# Patient Record
Sex: FEMALE | Race: ASIAN | Employment: UNEMPLOYED | URBAN - METROPOLITAN AREA
[De-identification: names, ages, dates, MRNs, and addresses within clinical notes are randomized per-mention and may not be internally consistent; named-entity substitution may affect disease eponyms.]

---

## 2020-06-22 ENCOUNTER — HOSPITAL ENCOUNTER (OUTPATIENT)
Dept: GENERAL RADIOLOGY | Facility: HOSPITAL | Age: 60
Discharge: HOME | End: 2020-06-22
Attending: INTERNAL MEDICINE
Payer: COMMERCIAL

## 2020-06-22 DIAGNOSIS — M79.641 PAIN IN RIGHT HAND: ICD-10-CM

## 2020-06-22 PROCEDURE — 73130 X-RAY EXAM OF HAND: CPT | Performed by: INTERNAL MEDICINE

## 2021-03-24 ENCOUNTER — IMMUNIZATION (OUTPATIENT)
Dept: LAB | Age: 61
End: 2021-03-24

## 2021-03-24 DIAGNOSIS — Z23 NEED FOR VACCINATION: Primary | ICD-10-CM

## 2021-03-24 PROCEDURE — 91300 COVID 19 PFIZER-BIONTECH: CPT | Performed by: HOSPITALIST

## 2021-03-24 PROCEDURE — 0001A COVID 19 PFIZER-BIONTECH: CPT | Performed by: HOSPITALIST

## 2021-04-15 ENCOUNTER — IMMUNIZATION (OUTPATIENT)
Dept: LAB | Age: 61
End: 2021-04-15
Attending: NURSE PRACTITIONER

## 2021-04-15 DIAGNOSIS — Z23 NEED FOR VACCINATION: Primary | ICD-10-CM

## 2021-04-15 PROCEDURE — 0002A COVID 19 PFIZER-BIONTECH: CPT

## 2021-04-15 PROCEDURE — 91300 COVID 19 PFIZER-BIONTECH: CPT

## 2021-04-23 ENCOUNTER — OFFICE VISIT (OUTPATIENT)
Dept: SURGERY | Facility: CLINIC | Age: 61
End: 2021-04-23
Payer: COMMERCIAL

## 2021-04-23 VITALS
HEIGHT: 62 IN | WEIGHT: 135.63 LBS | TEMPERATURE: 97 F | BODY MASS INDEX: 24.96 KG/M2 | HEART RATE: 69 BPM | DIASTOLIC BLOOD PRESSURE: 86 MMHG | SYSTOLIC BLOOD PRESSURE: 130 MMHG

## 2021-04-23 DIAGNOSIS — K80.20 CHOLELITHIASIS WITHOUT CHOLECYSTITIS: Primary | ICD-10-CM

## 2021-04-23 PROCEDURE — 3008F BODY MASS INDEX DOCD: CPT | Performed by: SURGERY

## 2021-04-23 PROCEDURE — 3075F SYST BP GE 130 - 139MM HG: CPT | Performed by: SURGERY

## 2021-04-23 PROCEDURE — 99203 OFFICE O/P NEW LOW 30 MIN: CPT | Performed by: SURGERY

## 2021-04-23 PROCEDURE — 3079F DIAST BP 80-89 MM HG: CPT | Performed by: SURGERY

## 2021-04-23 NOTE — H&P
New Patient Visit Note       Active Problems      1. Cholelithiasis without cholecystitis        Chief Complaint   Patient presents with:  Gallbladder: Gallbladder consult - Ref by: Ned Swift c/o No current abdominal pain.   Pt states feels pressure in u Gastrointestinal: Negative for abdominal distention, abdominal pain, anal bleeding, blood in stool, constipation, diarrhea, nausea and vomiting. Genitourinary: Negative for difficulty urinating, dysuria, frequency and urgency.    Musculoskeletal: Trent Chant as well. I spent 30 minutes face to face with the patient. More than 50% of that time was spent counseling the patient and/or on coordination of care. The diagnosis, prognosis, and general treatment was explained to the patient and the family.          Radha Al

## 2021-10-26 PROBLEM — R79.89 ELEVATED LFTS: Status: ACTIVE | Noted: 2021-10-26

## 2021-10-26 PROBLEM — K76.0 FATTY LIVER: Status: ACTIVE | Noted: 2021-10-26

## 2024-10-08 ENCOUNTER — OFFICE VISIT (OUTPATIENT)
Facility: LOCATION | Age: 64
End: 2024-10-08
Payer: MEDICARE

## 2024-10-08 ENCOUNTER — TELEPHONE (OUTPATIENT)
Facility: LOCATION | Age: 64
End: 2024-10-08

## 2024-10-08 VITALS
WEIGHT: 133 LBS | OXYGEN SATURATION: 97 % | HEIGHT: 61 IN | BODY MASS INDEX: 25.11 KG/M2 | SYSTOLIC BLOOD PRESSURE: 149 MMHG | DIASTOLIC BLOOD PRESSURE: 93 MMHG | HEART RATE: 87 BPM | RESPIRATION RATE: 18 BRPM | TEMPERATURE: 98 F

## 2024-10-08 DIAGNOSIS — K80.50 BILIARY COLIC: Primary | ICD-10-CM

## 2024-10-08 RX ORDER — METOPROLOL SUCCINATE 25 MG/1
25 TABLET, EXTENDED RELEASE ORAL DAILY
COMMUNITY
Start: 2024-06-28

## 2024-10-08 RX ORDER — LISINOPRIL 5 MG/1
5 TABLET ORAL DAILY
COMMUNITY
Start: 2024-06-27

## 2024-10-08 NOTE — H&P
Patient ID: Mai Rich is a 64 year old female.    Chief Complaint   Patient presents with    Gallstones     NP GALLSTONES patient had CT scan patient states she is having abdominal pain lower right side        HPI: Mai Rich is a 64 year old female presents to clinic for evaluation.  Patient previously saw Dr. Hathaway for abdominal pain.  At that time, she was found to have cholelithiasis on ultrasound imaging.  She did not undergo surgical intervention.  This was 4 years ago.  Recently, patient had another episode of severe abdominal pain.  She denied any abdominal bloating, nausea, or diarrhea.  She underwent CT imaging and ultrasound which redemonstrated cholelithiasis.  Patient does report having heartburn symptoms and a sour stomach but denies any other symptoms in the past 4 years.  Her abdominal surgeries include a .      Workup:               Past Medical History  Past Medical History:    High blood pressure       Past Surgical History  History reviewed. No pertinent surgical history.    Medications  Current Outpatient Medications   Medication Sig Dispense Refill    lisinopril 5 MG Oral Tab Take 1 tablet (5 mg total) by mouth daily.      metoprolol succinate ER 25 MG Oral Tablet 24 Hr Take 1 tablet (25 mg total) by mouth daily.      aspirin 81 MG Oral Tab EC Take 1 tablet (81 mg total) by mouth daily.         Allergies  Not on File    Social History  History   Smoking Status    Every Day   Smokeless Tobacco    Never     History   Alcohol Use Never     History   Drug Use Unknown       Family History  History reviewed. No pertinent family history.    Review of Systems  Review of Systems   Constitutional: Negative.    Respiratory: Negative.     Cardiovascular: Negative.    Gastrointestinal:  Positive for abdominal pain. Negative for abdominal distention, constipation, diarrhea, nausea and vomiting.       Exam  Vitals:    10/08/24 1432   BP: (!) 149/93   Pulse: 87   Resp: 18   Temp: 97.7 °F  (36.5 °C)     Physical Exam  Constitutional:       Appearance: Normal appearance.   Cardiovascular:      Rate and Rhythm: Normal rate.   Pulmonary:      Effort: Pulmonary effort is normal.   Abdominal:      General: Abdomen is flat. There is no distension.      Palpations: Abdomen is soft.      Tenderness: There is no abdominal tenderness.   Skin:     General: Skin is warm and dry.   Neurological:      Mental Status: She is alert and oriented to person, place, and time.           Assessment/Plan  Assessment   Problem List Items Addressed This Visit          Gastrointestinal and Abdominal    Biliary colic - Primary       Mai Rich is a 64 year old female with biliary colic    Based on patient's symptoms, I believe she is suffering from biliary colic  Although patient's severe episodes were only 2 and 4 years apart, she reports having intermittent heartburn symptoms throughout the years  I believe that this is a symptom of her gallbladder  I recommend proceeding with cholecystectomy  We will plan for laparoscopic cholecystectomy with intraoperative cholangiogram  Procedure explained to the patient  Risks including bleeding, pain, infection, bile leak, injury to the common bile duct, and need for further intervention all discussed with the patient  Postoperative restrictions also discussed, these include no heavy lifting greater than 15 to 20 pounds for 4-6 weeks  Patient will need medical clearance prior to surgery  Patient acknowledged understanding and wishes to proceed    Patient can call the office for any questions or concerns  Patient is to go to the emergency room for any worsening abdominal pain, nausea, vomiting, and fever as this may be acute cholecystitis and patient may need surgery sooner      Negra Ness MD  General Surgery  [facility]     CC:  Marielle Grewal MD

## 2024-10-08 NOTE — TELEPHONE ENCOUNTER
Rcvd message from PSR Jenna that Pt wants hortencia jensene completed this month by SEMAJ if sx can'rt be completed in October than wants sx in MARCH 2025 however there is no time this month- PT was offered 12/9- Pt is not scheduled enriqueta I lvm waiting to hear back from pt.

## 2024-10-14 ENCOUNTER — TELEPHONE (OUTPATIENT)
Facility: LOCATION | Age: 64
End: 2024-10-14

## 2024-10-14 DIAGNOSIS — K80.50 BILIARY COLIC: Primary | ICD-10-CM

## 2024-11-25 ENCOUNTER — TELEPHONE (OUTPATIENT)
Facility: LOCATION | Age: 64
End: 2024-11-25

## 2024-11-25 NOTE — TELEPHONE ENCOUNTER
SILVER WATSON Patient  Member ID  [id number]    Date of Birth  1960-08-25    Gender  Female    Eligibility Status  Active Coverage    Group Number  [id number]    Plan / Coverage Date  2024-01-01    Transaction Type  Outpatient Authorization    Organization  [facility]    Payer  AET[employer] (COMMERCIAL & MEDICARE)    [employer]etna logo  Transaction ID: Not FoundCustomer ID: [id number] Date: NA  No Authorization Required  Place of Service  [facility]    Service From - To Date  NA    Admission Type  9    Diagnosis Code 1   - Calculus of bile duct w/o cholangitis or cholecyst w/o obst    Procedure Code 1  51726    Quantity  1 Units    Procedure From - To Date  2024-12-09    Status  NO AUTH REQUIRED    Message  PRECERT IS NOT REQUIRED OR ONLY REQUIRED IN UNIQUE CIRCUMSTANCES FOR MEDICAID REFER TO PRIOR AUTH TOOL ON [employer] FOR ALL OTHERS REFER TO CODE SEARCH TOOL ON [employer] COVERAGE OF SERVICES ARE SUBJECT TO BENEFITS AND ELIGIBILITY

## 2024-12-09 ENCOUNTER — APPOINTMENT (OUTPATIENT)
Dept: GENERAL RADIOLOGY | Facility: HOSPITAL | Age: 64
End: 2024-12-09
Attending: STUDENT IN AN ORGANIZED HEALTH CARE EDUCATION/TRAINING PROGRAM
Payer: MEDICARE

## 2024-12-09 ENCOUNTER — ANESTHESIA (OUTPATIENT)
Dept: SURGERY | Facility: HOSPITAL | Age: 64
End: 2024-12-09
Payer: MEDICARE

## 2024-12-09 ENCOUNTER — ANESTHESIA EVENT (OUTPATIENT)
Dept: SURGERY | Facility: HOSPITAL | Age: 64
End: 2024-12-09
Payer: MEDICARE

## 2024-12-09 ENCOUNTER — HOSPITAL ENCOUNTER (OUTPATIENT)
Facility: HOSPITAL | Age: 64
Setting detail: HOSPITAL OUTPATIENT SURGERY
Discharge: HOME | End: 2024-12-09
Attending: STUDENT IN AN ORGANIZED HEALTH CARE EDUCATION/TRAINING PROGRAM | Admitting: STUDENT IN AN ORGANIZED HEALTH CARE EDUCATION/TRAINING PROGRAM
Payer: MEDICARE

## 2024-12-09 VITALS
WEIGHT: 131 LBS | HEIGHT: 61 IN | TEMPERATURE: 98 F | DIASTOLIC BLOOD PRESSURE: 85 MMHG | SYSTOLIC BLOOD PRESSURE: 150 MMHG | RESPIRATION RATE: 16 BRPM | HEART RATE: 85 BPM | BODY MASS INDEX: 24.73 KG/M2 | OXYGEN SATURATION: 95 %

## 2024-12-09 DIAGNOSIS — K80.50 BILIARY COLIC: Primary | ICD-10-CM

## 2024-12-09 PROCEDURE — 47563 LAPARO CHOLECYSTECTOMY/GRAPH: CPT | Performed by: STUDENT IN AN ORGANIZED HEALTH CARE EDUCATION/TRAINING PROGRAM

## 2024-12-09 PROCEDURE — 74300 X-RAY BILE DUCTS/PANCREAS: CPT | Performed by: STUDENT IN AN ORGANIZED HEALTH CARE EDUCATION/TRAINING PROGRAM

## 2024-12-09 PROCEDURE — BF131ZZ FLUOROSCOPY OF GALLBLADDER AND BILE DUCTS USING LOW OSMOLAR CONTRAST: ICD-10-PCS | Performed by: STUDENT IN AN ORGANIZED HEALTH CARE EDUCATION/TRAINING PROGRAM

## 2024-12-09 PROCEDURE — 0FT44ZZ RESECTION OF GALLBLADDER, PERCUTANEOUS ENDOSCOPIC APPROACH: ICD-10-PCS | Performed by: STUDENT IN AN ORGANIZED HEALTH CARE EDUCATION/TRAINING PROGRAM

## 2024-12-09 PROCEDURE — 47563 LAPARO CHOLECYSTECTOMY/GRAPH: CPT

## 2024-12-09 RX ORDER — SODIUM CHLORIDE, SODIUM LACTATE, POTASSIUM CHLORIDE, CALCIUM CHLORIDE 600; 310; 30; 20 MG/100ML; MG/100ML; MG/100ML; MG/100ML
INJECTION, SOLUTION INTRAVENOUS CONTINUOUS
Status: DISCONTINUED | OUTPATIENT
Start: 2024-12-09 | End: 2024-12-09

## 2024-12-09 RX ORDER — HYDROCODONE BITARTRATE AND ACETAMINOPHEN 5; 325 MG/1; MG/1
1 TABLET ORAL ONCE AS NEEDED
Status: COMPLETED | OUTPATIENT
Start: 2024-12-09 | End: 2024-12-09

## 2024-12-09 RX ORDER — ONDANSETRON 2 MG/ML
INJECTION INTRAMUSCULAR; INTRAVENOUS AS NEEDED
Status: DISCONTINUED | OUTPATIENT
Start: 2024-12-09 | End: 2024-12-09 | Stop reason: SURG

## 2024-12-09 RX ORDER — ACETAMINOPHEN 500 MG
1000 TABLET ORAL ONCE
Status: DISCONTINUED | OUTPATIENT
Start: 2024-12-09 | End: 2024-12-09 | Stop reason: HOSPADM

## 2024-12-09 RX ORDER — ASPIRIN 81 MG/1
81 TABLET ORAL DAILY
Status: SHIPPED | COMMUNITY
Start: 2024-12-11

## 2024-12-09 RX ORDER — PHENYLEPHRINE HCL 10 MG/ML
VIAL (ML) INJECTION AS NEEDED
Status: DISCONTINUED | OUTPATIENT
Start: 2024-12-09 | End: 2024-12-09 | Stop reason: SURG

## 2024-12-09 RX ORDER — ONDANSETRON 4 MG/1
4 TABLET, ORALLY DISINTEGRATING ORAL EVERY 4 HOURS PRN
Qty: 10 TABLET | Refills: 0 | Status: SHIPPED | OUTPATIENT
Start: 2024-12-09

## 2024-12-09 RX ORDER — HYDROMORPHONE HYDROCHLORIDE 1 MG/ML
0.6 INJECTION, SOLUTION INTRAMUSCULAR; INTRAVENOUS; SUBCUTANEOUS EVERY 5 MIN PRN
Status: DISCONTINUED | OUTPATIENT
Start: 2024-12-09 | End: 2024-12-09

## 2024-12-09 RX ORDER — ROCURONIUM BROMIDE 10 MG/ML
INJECTION, SOLUTION INTRAVENOUS AS NEEDED
Status: DISCONTINUED | OUTPATIENT
Start: 2024-12-09 | End: 2024-12-09 | Stop reason: SURG

## 2024-12-09 RX ORDER — NEOSTIGMINE METHYLSULFATE 1 MG/ML
INJECTION INTRAVENOUS AS NEEDED
Status: DISCONTINUED | OUTPATIENT
Start: 2024-12-09 | End: 2024-12-09 | Stop reason: SURG

## 2024-12-09 RX ORDER — HYDROCODONE BITARTRATE AND ACETAMINOPHEN 5; 325 MG/1; MG/1
2 TABLET ORAL ONCE AS NEEDED
Status: COMPLETED | OUTPATIENT
Start: 2024-12-09 | End: 2024-12-09

## 2024-12-09 RX ORDER — ONDANSETRON 2 MG/ML
4 INJECTION INTRAMUSCULAR; INTRAVENOUS EVERY 6 HOURS PRN
Status: DISCONTINUED | OUTPATIENT
Start: 2024-12-09 | End: 2024-12-09

## 2024-12-09 RX ORDER — LIDOCAINE HYDROCHLORIDE AND EPINEPHRINE 10; 10 MG/ML; UG/ML
INJECTION, SOLUTION INFILTRATION; PERINEURAL AS NEEDED
Status: DISCONTINUED | OUTPATIENT
Start: 2024-12-09 | End: 2024-12-09 | Stop reason: HOSPADM

## 2024-12-09 RX ORDER — ACETAMINOPHEN 500 MG
1000 TABLET ORAL ONCE AS NEEDED
Status: COMPLETED | OUTPATIENT
Start: 2024-12-09 | End: 2024-12-09

## 2024-12-09 RX ORDER — HYDROMORPHONE HYDROCHLORIDE 1 MG/ML
0.2 INJECTION, SOLUTION INTRAMUSCULAR; INTRAVENOUS; SUBCUTANEOUS EVERY 5 MIN PRN
Status: DISCONTINUED | OUTPATIENT
Start: 2024-12-09 | End: 2024-12-09

## 2024-12-09 RX ORDER — NALOXONE HYDROCHLORIDE 0.4 MG/ML
0.08 INJECTION, SOLUTION INTRAMUSCULAR; INTRAVENOUS; SUBCUTANEOUS AS NEEDED
Status: DISCONTINUED | OUTPATIENT
Start: 2024-12-09 | End: 2024-12-09

## 2024-12-09 RX ORDER — GLYCOPYRROLATE 0.2 MG/ML
INJECTION, SOLUTION INTRAMUSCULAR; INTRAVENOUS AS NEEDED
Status: DISCONTINUED | OUTPATIENT
Start: 2024-12-09 | End: 2024-12-09 | Stop reason: SURG

## 2024-12-09 RX ORDER — LIDOCAINE HYDROCHLORIDE 10 MG/ML
INJECTION, SOLUTION EPIDURAL; INFILTRATION; INTRACAUDAL; PERINEURAL AS NEEDED
Status: DISCONTINUED | OUTPATIENT
Start: 2024-12-09 | End: 2024-12-09 | Stop reason: SURG

## 2024-12-09 RX ORDER — HYDROMORPHONE HYDROCHLORIDE 1 MG/ML
0.4 INJECTION, SOLUTION INTRAMUSCULAR; INTRAVENOUS; SUBCUTANEOUS EVERY 5 MIN PRN
Status: DISCONTINUED | OUTPATIENT
Start: 2024-12-09 | End: 2024-12-09

## 2024-12-09 RX ORDER — BUPIVACAINE HYDROCHLORIDE 2.5 MG/ML
INJECTION, SOLUTION EPIDURAL; INFILTRATION; INTRACAUDAL AS NEEDED
Status: DISCONTINUED | OUTPATIENT
Start: 2024-12-09 | End: 2024-12-09 | Stop reason: HOSPADM

## 2024-12-09 RX ORDER — DEXAMETHASONE SODIUM PHOSPHATE 4 MG/ML
VIAL (ML) INJECTION AS NEEDED
Status: DISCONTINUED | OUTPATIENT
Start: 2024-12-09 | End: 2024-12-09 | Stop reason: SURG

## 2024-12-09 RX ORDER — LABETALOL HYDROCHLORIDE 5 MG/ML
5 INJECTION, SOLUTION INTRAVENOUS EVERY 5 MIN PRN
Status: DISCONTINUED | OUTPATIENT
Start: 2024-12-09 | End: 2024-12-09

## 2024-12-09 RX ORDER — METOCLOPRAMIDE HYDROCHLORIDE 5 MG/ML
10 INJECTION INTRAMUSCULAR; INTRAVENOUS EVERY 8 HOURS PRN
Status: DISCONTINUED | OUTPATIENT
Start: 2024-12-09 | End: 2024-12-09

## 2024-12-09 RX ORDER — TRAMADOL HYDROCHLORIDE 50 MG/1
50 TABLET ORAL EVERY 6 HOURS PRN
Qty: 20 TABLET | Refills: 0 | Status: SHIPPED | OUTPATIENT
Start: 2024-12-09

## 2024-12-09 RX ADMIN — GLYCOPYRROLATE 0.4 MG: 0.2 INJECTION, SOLUTION INTRAMUSCULAR; INTRAVENOUS at 12:03:00

## 2024-12-09 RX ADMIN — PHENYLEPHRINE HCL 100 MCG: 10 MG/ML VIAL (ML) INJECTION at 11:24:00

## 2024-12-09 RX ADMIN — ROCURONIUM BROMIDE 40 MG: 10 INJECTION, SOLUTION INTRAVENOUS at 10:58:00

## 2024-12-09 RX ADMIN — PHENYLEPHRINE HCL 50 MCG: 10 MG/ML VIAL (ML) INJECTION at 11:47:00

## 2024-12-09 RX ADMIN — DEXAMETHASONE SODIUM PHOSPHATE 4 MG: 4 MG/ML VIAL (ML) INJECTION at 11:00:00

## 2024-12-09 RX ADMIN — NEOSTIGMINE METHYLSULFATE 3 MG: 1 INJECTION INTRAVENOUS at 12:03:00

## 2024-12-09 RX ADMIN — SODIUM CHLORIDE, SODIUM LACTATE, POTASSIUM CHLORIDE, CALCIUM CHLORIDE: 600; 310; 30; 20 INJECTION, SOLUTION INTRAVENOUS at 12:03:00

## 2024-12-09 RX ADMIN — LIDOCAINE HYDROCHLORIDE 5 ML: 10 INJECTION, SOLUTION EPIDURAL; INFILTRATION; INTRACAUDAL; PERINEURAL at 10:58:00

## 2024-12-09 RX ADMIN — ONDANSETRON 4 MG: 2 INJECTION INTRAMUSCULAR; INTRAVENOUS at 11:07:00

## 2024-12-09 RX ADMIN — SODIUM CHLORIDE, SODIUM LACTATE, POTASSIUM CHLORIDE, CALCIUM CHLORIDE: 600; 310; 30; 20 INJECTION, SOLUTION INTRAVENOUS at 10:51:00

## 2024-12-09 NOTE — ANESTHESIA PREPROCEDURE EVALUATION
PRE-OP EVALUATION    Patient Name: Mai Rich    Admit Diagnosis: Biliary colic [K80.50]    Pre-op Diagnosis: Biliary colic [K80.50]    LAPAROSCOPIC CHOLECYSTECTOMY POSSIBLE OPEN WITH CHOLANGIOGRAM    Anesthesia Procedure: LAPAROSCOPIC CHOLECYSTECTOMY POSSIBLE OPEN WITH CHOLANGIOGRAM    Surgeons and Role:     * Negra Ness MD - Primary    Pre-op vitals reviewed.  Temp: 97.9 °F (36.6 °C)  Pulse: 78  Resp: 18  BP: 150/93  SpO2: 98 %  Body mass index is 24.75 kg/m².    Current medications reviewed.  Hospital Medications:  • acetaminophen (Tylenol Extra Strength) tab 1,000 mg  1,000 mg Oral Once   • lactated ringers infusion   Intravenous Continuous   • ceFAZolin (Ancef) 2g in 10mL IV syringe premix  2 g Intravenous Once       Outpatient Medications:   Prescriptions Prior to Admission[1]    Allergies: Patient has no known allergies.      Anesthesia Evaluation    Patient summary reviewed.    Anesthetic Complications  (-) history of anesthetic complications         GI/Hepatic/Renal      (+) GERD          (+) liver disease                 Cardiovascular    Negative cardiovascular ROS.    Exercise tolerance: good     MET: >4      (+) hypertension                                     Endo/Other    Negative endo/other ROS.                              Pulmonary    Negative pulmonary ROS.                       Neuro/Psych    Negative neuro/psych ROS.                                Past Surgical History:   Procedure Laterality Date   •        Social History     Socioeconomic History   • Marital status:    Tobacco Use   • Smoking status: Some Days     Types: Cigarettes   • Smokeless tobacco: Never   • Tobacco comments:     Per patient smokes socially    Vaping Use   • Vaping status: Never Used   Substance and Sexual Activity   • Alcohol use: Never   • Drug use: Never     History   Drug Use Unknown     Available pre-op labs reviewed.               Airway      Mallampati: II  Mouth opening: >3 FB  TM  distance: > 6 cm   Cardiovascular    Cardiovascular exam normal.         Dental  Comment: Discussed risk of dental injury with anesthesia especially to weakened teeth.            Pulmonary    Pulmonary exam normal.                 Other findings        ASA: 2   Plan: general  NPO status verified and patient meets guidelines.          Plan/risks discussed with: patient            Present on Admission:  **None**             [1]   Medications Prior to Admission   Medication Sig Dispense Refill Last Dose/Taking   • NON FORMULARY Vitamin D3 daily, patient does not know dosage   11/29/2024   • NON FORMULARY Calcium daily, patient does not know dosage   11/29/2024   • Coenzyme Q10 (Q-10 CO-ENZYME OR) Take by mouth. 1 daily, patient does not know dosage   11/29/2024   • lisinopril 5 MG Oral Tab Take 1 tablet (5 mg total) by mouth daily.   11/29/2024   • metoprolol succinate ER 25 MG Oral Tablet 24 Hr Take 1 tablet (25 mg total) by mouth daily.   11/29/2024   • aspirin 81 MG Oral Tab EC Take 1 tablet (81 mg total) by mouth daily.   11/29/2024

## 2024-12-09 NOTE — OPERATIVE REPORT
OPERATIVE NOTE    Mai Rich Location: OR   [facility] [id number] MRN [id number]   Admission Date 12/9/2024 Operation Date 12/9/2024   Attending Physician Negra Ness MD Operating Physician Negra Ness MD     PREOPERATIVE DIAGNOSIS  Biliary colic    POSTOPERATIVE DIAGNOSIS  Biliary colic  Umbilical hernia    PROCEDURE PERFORMED  Laparoscopic cholecystectomy with intraoperative cholangiogram  Transversus abdominis plane block  Primary umbilical hernia repair    SURGEON  Negra Ness MD    ASSISTANTS  ANTWON Laura her assistance was necessary for the conduct of this case especially in the careful dissection around the hilum, cholangiogram, and gallbladder removal    ANESTHESIA  General    INDICATIONS   This is a 64-year-old woman who presented to the outpatient setting with symptoms of biliary colic.  Cholecystectomy was indicated.  The procedure and all the risks were discussed with the patient and she consented.    FINDINGS  Umbilical hernia defect approximately 2 cm, normal cholangiogram with good flow of contrast into the duodenum and normal duct anatomy    FINDINGS/DESCRIPTION OF PROCEDURE  After informed consent, patient was brought to the operating room and placed in supine position with arms out. Bilateral SCDs were placed and pre-operative antibiotics administered. Anesthesia was induced without any complication. Patient was prepped and draped in the usual sterile fashion. Time out was performed confirming patient, procedure, and site.    Using blade, a curvilinear supraumbilical incision was made.  Upon palpation, patient had small umbilical hernia.  Using hemostats, umbilical stalk was circumferentially dissected to isolate the umbilical stalk.  Once the stalk was isolated, the hernia was resected off of the umbilicus.  The hernia sac was entered and contained preperitoneal fat.  The defect was approximately 2 centimeter.  Edges of the hernia sac at the level of the fascia were resected.   This was sent to pathology as hernia sac and contents.  An 11mm port was inserted into the defect and pneumoperitoneum created with CO2. Upon entrance, no injury was noted to omentum or bowel at site of entrance.  Three 5mm ports were placed under direct visualization; two on the right lateral aspect of the abdomen and the third subxiphoid, triangulating toward the location of the gallbladder. Transversus abdominis plane block was performed.    Upon inspection of the abdomen, gallbladder was distended but soft.  Gallbladder was grasped and retracted above the liver. Dissection was started at the infundibulum.  Patient did have some omental adhesions that were taken down bluntly.  Cystic duct and artery were identified, clearly critical view of safety.  The cystic artery took a trajectory that wrapped around the cystic duct.  The cystic duct was then clipped and partially opened. Cholangiogram catheter was inserted into the cystic duct and clipped in place. Cholangiogram was done and showed normal CBD and cystic duct with good flow of contrast into the duodenum. Catheter was removed and the cystic duct clipped and divided. The cystic artery was then clipped. The gallbladder was then resected from the fossa and placed in endocatch bag.  There was spillage of bile during the dissection. The gallbladder fossa was irrigated until clear. Hemostasis achieved. Gallbladder was extracted from abdominal cavity and sent for pathology.  Pneumoperitoneum was evacuated.  Fascia at the umbilicus was closed with a 0 Ethibond figure-of-eight suture.  Upon palpation, the hernia defect was completely closed.  The umbilicus was tacked down to the underlying fascia with a 2-0 Vicryl.  Skin at the umbilicus was reapproximated with 3-0 Vicryl deep dermals.  Skin incisions were closed with 4-o monocryl. Incisions were washed and dressed with dermabond.    At the end of the case, all counts were correct. Patient tolerated procedure well.  Patient was awakened and transferred to PACU in a hemodynamically stable condition.     SPECIMENS REMOVED  Gallbladder  Hernia sac and contents    ESTIMATED BLOOD LOSS  15 ml    COMPLICATIONS  None    Negra Ness MD

## 2024-12-09 NOTE — DISCHARGE INSTRUCTIONS
Cholecystectomy  Dr. Negra Ness    MEDICATIONS  For post-operative pain control, the medications are usually tramadol.  This is a narcotic and is best taken by starting with one tablet and repeating every four to six hours as needed.  If the patient does not feel they need the narcotics they shouldn’t take them.  If the pain is severe the patient may take up to two pills every six hours.  The patient can take tylenol 1g three times a day and ibuprofen 400-600mg in between up to 4 times a day as needed for pain as well.  The patient can take zofran 4mg every 6 hours as needed for nausea. The patient can also take miralax or colace as needed for constipation. Please ask your surgeon before resuming blood thinners such as aspirin, Plavix or Coumadin.  All other home medications may be resumed as scheduled.  With severe cholecystitis, antibiotics will also be prescribed.  With antibiotics, please watch for rash, facial swelling or severe diarrhea.    DIET  The patient may resume a general diet immediately.  This is not a good time to eat excessively.  The patient should eat in moderation and stick with foods the patient feels are easy to digest.  Avoid high fat foods in the immediate post-operative time period as this may still cause some problems.  The patient may eat anything in small amounts but foods rich in dairy products, fatty foods or fried foods may cause an upset stomach for up to six weeks after surgery.  There should be no alcohol consumption in the immediate recovery time period or within six hours of taking narcotics.    WOUND CARE  The dressing is usually a dissolvable glue which protects the wound. The patient can take a shower starting the day after surgery, but please, no baths or soaking. Please do not put any creams or ointments on the surgical incisions. If the patient does have a top dressing with clear tape and gauze, this dressing may be removed in 2 days. Do not remove the steri-strips or  butterfly tapes that are white and adherent to the skin.  The steri-strips will eventually peel up at the ends and at this point they may be removed.  This is usually seven to ten days after surgery.  When showering, soap can get on the wounds but do not scrub over the wounds.  No hair dye or chemicals of any kind should get in the wounds.  Avoid tub baths, swimming or sitting in a hot tub for two weeks.  If visible sutures or staples are present they will be removed in the office by the surgeon or nurse.  Most wounds will be closed with dissolving suture underneath the skin.  These sutures will dissolve on their own.  If a drain is present make sure the patient receives drain care instructions from the nurse prior to discharge.  Most patients will not have a drain.    ACTIVITY  Every day the patient should be up, showered and dressed.  Each day the patient should be up and around the house.  The patient should not lie in bed and should not stay in pajamas.  We count on the patient being up, coughing, walking and deep breathing to avoid pneumonia and blood clots in the legs.  Once a day the patient should get out of the house and go shopping, go to the mall, the [employer] store, the [employer] or a restaurant.  The patient may ride in a car but should not drive the car for at least one week.  Patients should be off narcotics for at least 8 hours prior to being a .  The average time off work is 10 to 14 days; most adults will be seeing the surgeon prior to returning to work.  Students will return to school within 1-5 days after discharge from the hospital but will be off gym, sports, and indoors for recess for one month.  Patients may go up and down stairs and lift up to five pounds but no bending, pushing or pulling.  Nothing called work or exercise until the follow up visit.  No ‘stair-master’, power walking, jogging or workouts until the follow up visit.  Patients should seek further activity limits at the time  of their appointment.    APPOINTMENT  Please call our office for an appointment within five to ten days of discharge.  Any fever greater than 100.5, chills, nausea, vomiting, or severe diarrhea please call our office.  If the wound turns red, hot, swollen, becomes increasingly painful, or drains pus call us immediately at [phone].  For life threatening emergencies call 911.  For non-emergent care please call our office after 8:30 a.m. Monday through Friday.  The number listed above is our office number; our phone automatically switches to our answering service if we are not there.  Please do not use the emergency room for non-urgent care.    Thank you for entrusting us with your care.  [facility]--General Surgery

## 2024-12-09 NOTE — H&P
[facility]  Report of Surgical History and Physical Exam    Mai Rich Patient Status:  Hospital Outpatient Surgery    1960 MRN [id number]   Location [facility] PERIOPERATIVE Attending Negra Ness MD   Hosp Day # 0 PCP Marielle Grewal MD     Chief Complaint: Biliary colic    History of Present Illness:  Mai Rich is a a(n) 64 year old female who presents for elective cholecystectomy. Since last clinic visit, no new issues.      History:  Past Medical History:    Disorder of liver    Fatty liver    Esophageal reflux    High blood pressure    Hx of motion sickness       Past Surgical History:   Procedure Laterality Date             History reviewed. No pertinent family history.     reports that she has been smoking cigarettes. She has never used smokeless tobacco. She reports that she does not drink alcohol and does not use drugs.      Allergies:  Allergies[1]      Medications:  No current facility-administered medications for this encounter.      Review of Systems:  The review of systems was negative other than the above listed HPI and past medical history including the HEENT, Heart, Lungs, GI, , Neuro, Musculoskeletal, Hematologic, Endocrine and Psych.       Physical Exam:  Height 61\", weight 125 lb (56.7 kg).  General: Alert, orientated x3.  Cooperative.  No apparent distress.  HEENT: Exam is unremarkable.  Without scleral icterus.  Mucous membranes are moist. EOM are WNL.  Neck: No tenderness to palpitation.  Full range of motion to flexion and extension, lateral rotation and lateral flexion of cervical spine.  No JVD. Supple.   Lungs: Bilateral chest rise. Good excursion of the diaphragms. No secondary use of accessory respiratory musculature.  Cardiac: Regular rate and rhythm. No murmur.  Abdomen:  Soft, nontender, non distended  Extremities:  No lower extremity edema noted.  Without clubbing or cyanosis.  2+ pulses x4  Skin: Normal texture and turgor.      Laboratory  Data and Relevant Imaging:  No results for input(s): \"RBC\", \"HGB\", \"HCT\", \"MCV\", \"MCH\", \"MCHC\", \"RDW\", \"NEPRELIM\", \"WBC\", \"PLT\" in the last 168 hours.    No results for input(s): \"GLU\", \"BUN\", \"CREATSERUM\", \"GFRAA\", \"GFRNAA\", \"CA\", \"ALB\", \"NA\", \"K\", \"CL\", \"CO2\", \"ALKPHO\", \"AST\", \"ALT\", \"BILT\", \"TP\" in the last 168 hours.      No results for input(s): \"PTP\", \"INR\", \"PTT\" in the last 168 hours.    Imaging  None      Impression/Plan:  Patient Active Problem List   Diagnosis    Cholelithiasis without cholecystitis    Elevated LFTs    Fatty liver    Biliary colic       64 year old female with Biliary colic    Will proceed as scheduled  All questions answered    Thank you for letting me participate in the care of this patient    Negra Ness MD  [facility] General Surgery  12/9/2024  8:06 AM           [1] No Known Allergies

## 2024-12-09 NOTE — ANESTHESIA POSTPROCEDURE EVALUATION
[facility]    Mai Rich Patient Status:  Hospital Outpatient Surgery   Age/Gender 64 year old female MRN [id number]   Location [facility] POST ANESTHESIA CARE UNIT Attending Negra Ness MD   Hosp Day # 0 PCP Marielle Grewal MD       Anesthesia Post-op Note    LAPAROSCOPIC CHOLECYSTECTOMY WITH CHOLANGIOGRAM, UMBILICAL HERNIA REPAIR    Procedure Summary       Date: 12/09/24 Room / Location:  MAIN OR 04 /  MAIN OR    Anesthesia Start: 1051 Anesthesia Stop: 1215    Procedure: LAPAROSCOPIC CHOLECYSTECTOMY WITH CHOLANGIOGRAM, UMBILICAL HERNIA REPAIR (Abdomen) Diagnosis:       Biliary colic      Umbilical hernia      (Biliary colic [K80.50] Umbilical hernia [114097])    Surgeons: Negra Ness MD Anesthesiologist: Gauri Morton MD    Anesthesia Type: general ASA Status: 2            Anesthesia Type: general    Vitals Value Taken Time   /79 12/09/24 1225   Temp 97.7 °F (36.5 °C) 12/09/24 1215   Pulse 70 12/09/24 1227   Resp 14 12/09/24 1227   SpO2 93 % 12/09/24 1227   Vitals shown include unfiled device data.    Patient Location: PACU    Anesthesia Type: general    Airway Patency: patent and extubated    Postop Pain Control: adequate    Mental Status: preanesthetic baseline    Nausea/Vomiting: none    Cardiopulmonary/Hydration status: stable euvolemic    Complications: no apparent anesthesia related complications    Postop vital signs: stable    Dental Exam: Unchanged from Preop    Patient to be discharged from PACU when criteria met.

## 2024-12-09 NOTE — ANESTHESIA PROCEDURE NOTES
Airway  Date/Time: 12/9/2024 10:59 AM  Urgency: elective    Airway not difficult    General Information and Staff    Patient location during procedure: OR  Anesthesiologist: Gauri Morton MD  Performed: anesthesiologist   Performed by: Gauri Morton MD  Authorized by: Gauri Morton MD      Indications and Patient Condition  Indications for airway management: anesthesia  Sedation level: deep  Preoxygenated: yes  Patient position: sniffing  Mask difficulty assessment: 1 - vent by mask    Final Airway Details  Final airway type: endotracheal airway      Successful airway: ETT  Cuffed: yes   Successful intubation technique: direct laryngoscopy  Facilitating devices/methods: intubating stylet  Endotracheal tube insertion site: oral  Blade: Aubree  Blade size: #3  ETT size (mm): 7.0    Cormack-Lehane Classification: grade I - full view of glottis  Placement verified by: capnometry   Measured from: lips  ETT to lips (cm): 22  Number of attempts at approach: 1

## 2024-12-24 ENCOUNTER — OFFICE VISIT (OUTPATIENT)
Facility: LOCATION | Age: 64
End: 2024-12-24
Payer: MEDICARE

## 2024-12-24 VITALS
DIASTOLIC BLOOD PRESSURE: 87 MMHG | TEMPERATURE: 97 F | BODY MASS INDEX: 24.73 KG/M2 | SYSTOLIC BLOOD PRESSURE: 156 MMHG | HEART RATE: 75 BPM | OXYGEN SATURATION: 97 % | HEIGHT: 61 IN | WEIGHT: 131 LBS

## 2024-12-24 DIAGNOSIS — Z90.49 HISTORY OF CHOLECYSTECTOMY: ICD-10-CM

## 2024-12-24 DIAGNOSIS — Z98.890 POSTOPERATIVE STATE: Primary | ICD-10-CM

## 2024-12-24 PROCEDURE — 1159F MED LIST DOCD IN RCRD: CPT

## 2024-12-24 PROCEDURE — 99024 POSTOP FOLLOW-UP VISIT: CPT

## 2024-12-24 PROCEDURE — 1160F RVW MEDS BY RX/DR IN RCRD: CPT

## 2024-12-24 PROCEDURE — 1170F FXNL STATUS ASSESSED: CPT

## 2024-12-24 RX ORDER — OMEPRAZOLE 40 MG/1
CAPSULE, DELAYED RELEASE ORAL
COMMUNITY
Start: 2024-12-05

## 2024-12-24 NOTE — PROGRESS NOTES
Follow Up Visit Note       Active Problems      1. Postoperative state    2. History of cholecystectomy          Chief Complaint   Chief Complaint   Patient presents with    Post-Op     PO -  w/ leh - LAPAROSCOPIC CHOLECYSTECTOMY WITH CHOLANGIOGRAM, UMBILICAL HERNIA REPAIR, no symptoms.         History of Present Illness  Mai is a 64 year old female who underwent laparoscopic cholecystectomy, umbilical hernia repair with Dr. Ness on 2024. She presents to clinic today for follow up evaluation.    She reports she is overall doing well following surgery. She reports tolerating diet without nausea or vomiting. She reports abdominal soreness for 3 days following surgery. She reports having looser stools following surgery but states they are firming up. She does not bright red blood with wiping. She denies passing a large amount of blood or blood clots.  She denies urinary symptoms. She denies fever or chills. She reports she is traveling to [place] in January and will return sometime in April.    Specimen pathology as below:  A.  Umbilical hernia sac and contents:  -Benign fibrofatty tissue.     B.  Gallbladder, cholecystectomy:  -Chronic cholecystitis.  -Multiple intraluminal gallstones, in aggregate 2.5 x 1.3 x 0.7 cm.  -Microscopic gallstone material trapped in the mucosal folds, and also present in the cystic duct lumen.    Allergies  Mai has No Known Allergies.    Past Medical / Surgical / Social / Family History    The past medical and past surgical history have been reviewed by me today.    Past Medical History:    Disorder of liver    Fatty liver    Esophageal reflux    High blood pressure    Hx of motion sickness     Past Surgical History:   Procedure Laterality Date             The family history and social history have been reviewed by me today.    History reviewed. No pertinent family history.  Social History     Socioeconomic History    Marital status:    Tobacco Use    Smoking  status: Some Days     Types: Cigarettes    Smokeless tobacco: Never    Tobacco comments:     Per patient smokes socially    Vaping Use    Vaping status: Never Used   Substance and Sexual Activity    Alcohol use: Never    Drug use: Never        Current Outpatient Medications:     Omeprazole 40 MG Oral Capsule Delayed Release, TAKE 1 CAPSULE VIA NG TUBE DAILY 30 MINUTES BEFORE MORNING MEAL, Disp: , Rfl:     aspirin 81 MG Oral Tab EC, Take 1 tablet (81 mg total) by mouth daily., Disp: , Rfl:     NON FORMULARY, Vitamin D3 daily, patient does not know dosage, Disp: , Rfl:     NON FORMULARY, Calcium daily, patient does not know dosage, Disp: , Rfl:     Coenzyme Q10 (Q-10 CO-ENZYME OR), Take by mouth. 1 daily, patient does not know dosage, Disp: , Rfl:     lisinopril 5 MG Oral Tab, Take 1 tablet (5 mg total) by mouth daily., Disp: , Rfl:     metoprolol succinate ER 25 MG Oral Tablet 24 Hr, Take 1 tablet (25 mg total) by mouth daily., Disp: , Rfl:     ondansetron 4 MG Oral Tablet Dispersible, Take 1 tablet (4 mg total) by mouth every 4 (four) hours as needed for Nausea. (Patient not taking: Reported on 12/24/2024), Disp: 10 tablet, Rfl: 0    traMADol 50 MG Oral Tab, Take 1 tablet (50 mg total) by mouth every 6 (six) hours as needed for Pain. (Patient not taking: Reported on 12/24/2024), Disp: 20 tablet, Rfl: 0     Review of Systems  The Review of Systems has been reviewed by me during today.  Review of Systems   Constitutional:  Negative for appetite change, chills, fatigue and fever.   Gastrointestinal:  Positive for blood in stool and diarrhea. Negative for abdominal distention, abdominal pain, constipation, nausea, rectal pain and vomiting.   Genitourinary:  Negative for difficulty urinating, dysuria, hematuria and urgency.   Skin:  Negative for rash and wound.        Physical Findings   /87 (BP Location: Left arm, Patient Position: Sitting, Cuff Size: adult)   Pulse 75   Temp 97 °F (36.1 °C) (Temporal)   Ht 61\"    Wt 131 lb (59.4 kg)   SpO2 97%   BMI 24.75 kg/m²   Physical Exam  Vitals reviewed.   Constitutional:       General: She is not in acute distress.     Appearance: Normal appearance. She is not ill-appearing.   HENT:      Head: Normocephalic.   Eyes:      General: No scleral icterus.     Conjunctiva/sclera: Conjunctivae normal.   Pulmonary:      Effort: Pulmonary effort is normal. No respiratory distress.   Abdominal:      General: There is no distension.      Palpations: Abdomen is soft.      Tenderness: There is no abdominal tenderness. There is no guarding or rebound.      Comments: Abdomen soft, non-distended, non-tender to palpation. Laparoscopic incision x 4 are clean, dry and healing appropriately. No surrounding erythema or drainage. No fluctuance to palpation. No signs of infection. Dermabond  in place.      Skin:     General: Skin is warm and dry.      Coloration: Skin is not jaundiced.      Findings: No bruising or erythema.   Neurological:      Mental Status: She is alert.   Psychiatric:         Mood and Affect: Mood normal.         Behavior: Behavior normal.          Assessment   1. Postoperative state    2. History of cholecystectomy        Plan   The patient is doing well postoperatively.  Continue Diet as tolerated. Encourage a high fiber diet.   I discussed with the patient that loose stool after gallbladder surgery can occur and should improve with time. I recommend they continue to monitor their symptoms and track any trigger foods. If symptoms persist by 6-8 weeks post op, they should return to clinic for follow up evaluation. She may take metamucil or benefiber supplements to help firm up her stool. I also discussed with her that her bleeding is likely secondary to internal hemorrhoids. I recommend high fiber diet, avoid prolonged sitting on the toilet. I did recommend she follow up with Dr. Ness for further evaluation, but she states she would like to wait until she returns from [place].    Tylenol and Ibuprofen as needed for pain control.   Continue local wound care, soap and water to the incisions.   Pathology discussed with the patient.   Recommend Lifting restrictions of no greater than 15-20 lbs for 6 weeks postoperatively  I discussed with the patient that she is cleared to fly to [place]. I discussed with her that following surgery she is at increased risk of developing blood clots. Recommend wearing compression socks and ambulating frequently.   All the patient's questions and concerns were addressed. They voiced understanding and are in agreement with the plan     Follow Up  They may follow up with [facility] general surgery on an as-needed basis.      Shanna Medellin PA-C

## (undated) DEVICE — L-HOOK CAUTERY PROBE TIP, DISPOSABLE: Brand: RENEW

## (undated) DEVICE — ANTIBACTERIAL UNDYED BRAIDED (POLYGLACTIN 910), SYNTHETIC ABSORBABLE SUTURE: Brand: COATED VICRYL

## (undated) DEVICE — SOLUTION IRRIG 1000ML 0.9% NACL USP BTL

## (undated) DEVICE — GLOVE SUR 6.5 SENSICARE PI PIP CRM PWD F

## (undated) DEVICE — BINDER ABD UNISX 9IN 45IN SM AND M UNIV

## (undated) DEVICE — SYRINGE MED 20ML STD LUERLOCK TIP AUTOCLV RIG

## (undated) DEVICE — HUNTER GASPER TIP, DISPOSABLE: Brand: RENEW

## (undated) DEVICE — SUT MCRYL 4-0 18IN PS-2 ABSRB UD 19MM 3/8 CIR

## (undated) DEVICE — ADHESIVE SKIN TOP FOR WND CLSR DERMBND ADV

## (undated) DEVICE — 40580 - THE PINK PAD - ADVANCED TRENDELENBURG POSITIONING KIT: Brand: 40580 - THE PINK PAD - ADVANCED TRENDELENBURG POSITIONING KIT

## (undated) DEVICE — COVER,LIGHT,CAMERA,HARD,1/PK,STRL: Brand: MEDLINE

## (undated) DEVICE — DALE ABDOMINAL BINDER, 12" WIDE, STRETCHES TO FIT 30"-45", 1 PER BOX.: Brand: DALE ABDOMINAL BINDER

## (undated) DEVICE — POUCH SPECIMEN WIRE 6X3 250ML

## (undated) DEVICE — TROCAR: Brand: KII SHIELDED BLADED ACCESS SYSTEM

## (undated) DEVICE — #15 STERILE STAINLESS BLADE: Brand: STERILE STAINLESS BLADES

## (undated) DEVICE — LAPAROVUE VISIBILITY SYSTEM LAPAROSCOPIC SOLUTIONS: Brand: LAPAROVUE

## (undated) DEVICE — SUT COAT VCRL + 0 54IN ABSRB UD ANTIBACT

## (undated) DEVICE — Device: Brand: SUTURE PASSOR PRO

## (undated) DEVICE — TRADITIONAL MARYLAND DISSECTOR TIP, DISPOSABLE: Brand: RENEW

## (undated) DEVICE — ENDOPATH ULTRA VERESS INSUFFLATION NEEDLES WITH LUER LOCK CONNECTORS: Brand: ENDOPATH

## (undated) DEVICE — GLOVE SUR 6.5 SENSICARE PI PIP GRN PWD F

## (undated) DEVICE — SLEEVE COMPR MD KNEE LEN SGL USE KENDALL SCD

## (undated) DEVICE — TROCAR: Brand: KII® SLEEVE

## (undated) DEVICE — SYRINGE MED 10ML LL TIP W/O SFTY DISP

## (undated) DEVICE — GRABBER GRASPER TIP, DISPOSABLE: Brand: RENEW

## (undated) DEVICE — SUT COAT VCRL 2-0 27IN SH ABSRB UD 26MM 1/2

## (undated) DEVICE — LAP CHOLE/APPY CDS-LF: Brand: MEDLINE INDUSTRIES, INC.

## (undated) DEVICE — CLIP APPLIER WITH CLIP LOGIC TECHNOLOGY: Brand: ENDO CLIP III

## (undated) DEVICE — SHEET,DRAPE,40X58,STERILE: Brand: MEDLINE

## (undated) DEVICE — SUT ETHBND XL 0 30IN CT-1 NABSRB GRN 36MM 1/2

## (undated) DEVICE — MINI ENDOCUT SCISSOR TIP, DISPOSABLE: Brand: RENEW

## (undated) DEVICE — TROCAR: Brand: KII FIOS FIRST ENTRY

## (undated) DEVICE — ZZ--CONVERTED-TO-500976-PENCIL SMK EVAC L10FT MPLR BLDE JAW OPN

## (undated) NOTE — LETTER
OUTSIDE TESTING RESULT REQUEST     IMPORTANT: FOR YOUR IMMEDIATE ATTENTION  Please FAX all test results listed below to: [phone]     Testing already done on or about: 24    * * * * If testing is NOT complete, arrange with patient A.S.A.P. * * * *      Patient Name: Mai Rich  Surgery Date: 2024  Medical Record: [id number]  CSN: [id number]  : 1960 - A: 64 y     Sex: female  Surgeon(s):  Negra Ness MD  Procedure: LAPAROSCOPIC CHOLECYSTECTOMY POSSIBLE OPEN WITH CHOLANGIOGRAM  Anesthesia Type: General     Surgeon: Negra Ness MD     The following Testing and Time Line are REQUIRED PER ANESTHESIA     EKG READ AND SIGNED WITHIN   90 days  CBC, Platelet; NO Differential within  30 days  CMP (requires 4 hour fast) within  90 days      Per patient, would like to have pre-op testing done through your office.    Thank You,   Sent by:Aarti TERRY

## (undated) NOTE — LETTER
DR ARZOLA      Surgical Clearance Needed    Date: 10/8/2024                                                                       From: BIANCA Mejía: DR. TAVAREZ                                                                                Fax:     RE: Surgical Clearance               # of Pages: 1        Patient Name: Mai Rich    Patient YOB: 1960    Consult For: MEDICAL CLEARANCE    Surgery: LAPAROSCOPIC CHOLECYSTECTOMY POSSIBLE OPEN WITH CHOLANGIOGRAM (C-ARM)    Date of Surgery: TBD AT [facility]        This facsimile transmission is provided for your internal use only. If the reader of this message is not the intended recipient, you are hereby notified that you have received this document in error, and that any review, dissemination, distribution, or copying of this message is strictly prohibited. If you have received this communication in error, please notify us immediately by telephone and return the original message to us by mail.